# Patient Record
Sex: FEMALE | Race: WHITE | NOT HISPANIC OR LATINO | URBAN - METROPOLITAN AREA
[De-identification: names, ages, dates, MRNs, and addresses within clinical notes are randomized per-mention and may not be internally consistent; named-entity substitution may affect disease eponyms.]

---

## 2018-07-22 ENCOUNTER — EMERGENCY (EMERGENCY)
Facility: HOSPITAL | Age: 68
LOS: 1 days | Discharge: HOME | End: 2018-07-22
Attending: EMERGENCY MEDICINE | Admitting: EMERGENCY MEDICINE
Payer: MEDICARE

## 2018-07-22 VITALS
OXYGEN SATURATION: 96 % | DIASTOLIC BLOOD PRESSURE: 67 MMHG | RESPIRATION RATE: 18 BRPM | SYSTOLIC BLOOD PRESSURE: 135 MMHG | HEART RATE: 105 BPM | TEMPERATURE: 98 F

## 2018-07-22 DIAGNOSIS — R41.82 ALTERED MENTAL STATUS, UNSPECIFIED: ICD-10-CM

## 2018-07-22 DIAGNOSIS — F10.129 ALCOHOL ABUSE WITH INTOXICATION, UNSPECIFIED: ICD-10-CM

## 2018-07-22 PROCEDURE — 99285 EMERGENCY DEPT VISIT HI MDM: CPT

## 2018-07-22 PROCEDURE — 99284 EMERGENCY DEPT VISIT MOD MDM: CPT | Mod: 25

## 2018-07-22 PROCEDURE — 82962 GLUCOSE BLOOD TEST: CPT

## 2018-07-22 NOTE — ED PROVIDER NOTE - CROS ED CONS ALL NEG
Left hip steroid injection with sedation ordered. Patient is on Eliquis, and Dr Charity De Souza will only approve to hold for 3 days. Last procedure we did 4 days with Lovenox, but patient stated he was told not to do that again because he had large hematomas. Are we able to procedure with hip injections while only holding Eliquis for 3 days?  Please advise
Where did he have hematomas, was at injection site from last procedure ?
negative...

## 2018-07-22 NOTE — ED ADULT TRIAGE NOTE - NS ED TRIAGE HISTORIAN
I have reviewed the history and physical and examined the patient and find no relevant changes. I have reviewed with the patient and/or family the risks, benefits, and alternatives to the procedure.     Tamanna Ortega,   3/21/2018 Patient

## 2018-07-22 NOTE — ED PROVIDER NOTE - OBJECTIVE STATEMENT
68 yo F with hx of etoh abuse- drinks 1 pint per day  last drink 5 pm today called 911 - states she thought her ex  was trying to come into her apt-- - no evidence he was there per EMS  no N/V no chest pain or sob  no syncope or head trauma or falls

## 2018-07-22 NOTE — ED ADULT TRIAGE NOTE - ARRIVAL INFO ADDITIONAL COMMENTS
pt called 911 because she thought her ex  was knocking on her door.  upon police arrival pt was confused.  admits to excessive etoh.
